# Patient Record
Sex: FEMALE | Race: WHITE | NOT HISPANIC OR LATINO | Employment: FULL TIME | URBAN - METROPOLITAN AREA
[De-identification: names, ages, dates, MRNs, and addresses within clinical notes are randomized per-mention and may not be internally consistent; named-entity substitution may affect disease eponyms.]

---

## 2024-05-10 ENCOUNTER — TELEPHONE (OUTPATIENT)
Dept: TRANSPLANT | Facility: HOSPITAL | Age: 39
End: 2024-05-10
Payer: COMMERCIAL

## 2024-05-10 NOTE — TELEPHONE ENCOUNTER
After confirming identity, intake reviewed with Francesca. Informed her that she is not blood type compatible with the recipient but we can proceed with paired donation. We discussed KPD vs voucher donation. She is willing to proceed with donor evaluation and would like to attend class on 5/20/2024. Informed her that she will receive a Zoom link for the class and not to log in to the [employer] link. Francesca expressed understanding.

## 2024-05-17 ENCOUNTER — TELEPHONE (OUTPATIENT)
Dept: TRANSPLANT | Facility: HOSPITAL | Age: 39
End: 2024-05-17
Payer: COMMERCIAL

## 2024-05-20 ENCOUNTER — EDUCATION (OUTPATIENT)
Dept: TRANSPLANT | Facility: HOSPITAL | Age: 39
End: 2024-05-20
Payer: COMMERCIAL

## 2024-05-20 NOTE — PROGRESS NOTES
Patient received education regarding the following topics as part of their living donor evaluation:  The evaluation process, including:  ·     Transplant team members and roles   ·     Required consultations and testing  ·     Selection criteria and suitability for donation  ·     Psychosocial and financial considerations for a successful transplant  ·     Patient responsibilities, including the necessity of adhering to a strict medical regimen  An overview of the surgical procedure   Potential medical, surgical, and psychosocial risks to transplantation, including:  ·     Wound infection  ·     Pneumonia  ·     Blood clot formation  ·     Complications with remaining kidney including kidney failure and need for dialysis or kidney transplant  ·     Arrhythmias and cardiovascular collapse  ·     Multi-organ system failure  ·     Death  ·     Depression  ·     Post-Traumatic Stress Disorder  ·     Generalized anxiety, issues of dependence, and feelings of guilt  Available alternatives to transplantation  National and Transplant [place] outcomes for one-year patient and graft-survival from the most recent [facility] program-specific report.   Donor risk factors that could affect the success of the transplant and the health of the patient, including:  ·     Donor age  ·     Donor medical and social history  ·     Condition of the organ  ·     Risk of izzy cancer, HIV, Hepatitis B, Hepatitis C, or malaria if the infection is not detectable at the time of donation  Patient's right to withdraw consent for donation at any time during the process. Patient's consent to donate willingly and without pressure (coercion) from anyone, and will not receive payment or financial reward for donating organ.  Transplants not performed in a Medicare-approved transplant center could affect the patient's ability to have immunosuppression medication paid for under Medicare part B.   Multiple listing options.     Patient was given the  opportunity to have questions answered and understands that the living donor team will be available to assist the patient throughout the entire donation process. Patient was provided a copy of the signed informed consent for living donor donation.     Signed evaluation informed consent received? 05/20/2024

## 2024-05-24 DIAGNOSIS — Z52.4 DONOR OF KIDNEY FOR TRANSPLANT: ICD-10-CM

## 2024-05-24 DIAGNOSIS — Z52.4 DONOR OF KIDNEY FOR TRANSPLANT: Primary | ICD-10-CM

## 2024-05-28 ENCOUNTER — TELEPHONE (OUTPATIENT)
Dept: TRANSPLANT | Facility: HOSPITAL | Age: 39
End: 2024-05-28
Payer: COMMERCIAL

## 2024-06-10 DIAGNOSIS — Z52.4 DONOR OF KIDNEY FOR TRANSPLANT: ICD-10-CM

## 2024-06-25 ENCOUNTER — DOCUMENTATION (OUTPATIENT)
Dept: TRANSPLANT | Facility: HOSPITAL | Age: 39
End: 2024-06-25
Payer: SUBSIDIZED

## 2024-06-25 LAB
ALANINE AMINOTRANSFERASE (SGPT) (U/L) IN SER/PLAS EXTERNAL: 31 U/L
ALBUMIN (G/DL) IN SER/PLAS EXTERNAL: 5 G/DL
ALBUMIN (MG/L) IN URINE: 7 MG/L
ALBUMIN/CREATININE (UG/MG) IN URINE: 5 UG/MG CREAT
ALKALINE PHOSPHATASE (U/L) IN SER/PLAS EXTERNAL: 111 U/L
AMPHETAMINES,URINE: NEGATIVE
ANION GAP IN SER/PLAS EXTERNAL: 7 MMOL/L
ASPARTATE AMINOTRANSFERASE (SGOT) (U/L) IN SER/PLAS EXTERNAL: 23 U/L
BILIRUBIN TOTAL (MG/DL) IN SER/PLAS EXTERNAL: 0.7 MG/DL
CALCIUM (MG/DL) IN SER/PLAS EXTERNAL: 9.8 MG/DL
CARBON DIOXIDE, TOTAL (MMOL/L) IN SER/PLAS EXTERNAL: 27 MMOL/L
CHLORIDE (MMOL/L) IN SER/PLAS EXTERNAL: 103 MMOL/L
CHOLESTEROL (MG/DL) IN SER/PLAS EXTERNAL: 158 MG/DL
CHOLESTEROL IN HDL (MG/DL) IN SER/PLAS EXTERNAL: 49 MG/DL
CHOLESTEROL IN LDL (MG/DL) IN SERUM OR PLASMA BY CALCULATION EXTERNAL: 94 MG/DL
CHOLESTEROL/HDL RATIO EXTERNAL: 3.2
COCAINE (PRESENCE) IN URINE BY SCREEN METHOD: NEGATIVE
CREATININE (MG/DL) IN SER/PLAS EXTERNAL: 0.76 MG/DL
CYSTATIN C: 0.88
CYTOMEGALOVIRUS IGG ANTIBODY: NEGATIVE
CYTOMEGALOVIRUS IGM ANTIBODY: NEGATIVE
EBV VCA IGG: POSITIVE
ERYTHROCYTE DISTRIBUTION WIDTH (RATIO) BY AUTOMATED COUNT EXTERNAL: 12.1 %
ERYTHROCYTE MEAN CORPUSCULAR HEMOGLOBIN (PG) BY AUTOMATED COUNT EXTERNAL: 27.8 PG
ERYTHROCYTE MEAN CORPUSCULAR HGB CONCENTRATION (G/DL) BY AUTOMATED EXT: 33.1 G/DL
ERYTHROCYTE MEAN CORPUSCULAR VOLUME (FL) BY AUTOMATED COUNT EXTERNAL: 84 FL
ERYTHROCYTES (10*6/UL) IN BLOOD BY AUTOMATED COUNT EXTERNAL: ABNORMAL
EXTERNAL ABO GROUPING: NORMAL
GLOMERULAR FILTRATION RATE ML/MIN/1.73 SQ M.PREDICTED EXTERNAL: 103 ML/MIN/1.73M*2
GLUCOSE (MG/DL) IN SER/PLAS EXTERNAL: 94 MG/DL
HEMATOCRIT (%) IN BLOOD BY AUTOMATED COUNT EXTERNAL: 44.1 %
HEMOGLOBIN (G/DL) IN BLOOD EXTERNAL: 14.6 G/DL
HEPATITIS C VIRUS AB PRESENCE IN SERUM EXTERNAL: NONREACTIVE
HIV 1/ 2 AG/AB SCREEN: NONREACTIVE
INR IN PPP BY COAGULATION ASSAY: 1 (ref 0.9–1.1)
LEUKOCYTES (10*3/UL) IN BLOOD BY AUTOMATED COUNT EXTERNAL: 4.9 X10*3/UL
Lab: 153 MG/DL
Lab: 29.62
Lab: NEGATIVE
Lab: NONREACTIVE
Lab: NONREACTIVE
Lab: NORMAL
NON HDL CHOLESTEROL EXTERNAL: 109 MG/DL
NRBC (PER 100 WBCS) BY AUTOMATED COUNT EXTERNAL: 5.3 /100 WBCS
OPIATES (PRESENCE) IN URINE BY SCREEN METHOD: NEGATIVE
OXYCODONE: NEGATIVE NG/ML
PARTIAL THROMBOPLASTIN TIME MECHANICAL: 33.2
PHOSPHORUS: 3
PLATELET MEAN VOLUME (FL) IN BLOOD BY AUTOMATED COUNT EXTERNAL: 10.9 FL
PLATELETS (10*3/UL) IN BLOOD AUTOMATED COUNT EXTERNAL: 301 X10*3/UL
POC APPEARANCE, URINE: ABNORMAL
POC BILIRUBIN, URINE: NEGATIVE
POC BLOOD, URINE: ABNORMAL
POC COLOR, URINE: YELLOW
POC GLUCOSE, URINE: NEGATIVE MG/DL
POC KETONES, URINE: NEGATIVE MG/DL
POC LEUKOCYTES, URINE: NEGATIVE
POC NITRITE,URINE: NEGATIVE
POC PH, URINE: 5.5 PH
POC PROTEIN, URINE: NEGATIVE MG/DL
POC SPECIFIC GRAVITY, URINE: 1.02
POC UROBILINOGEN, URINE: 0.2 EU/DL
POTASSIUM (MMOL/L) IN SER/PLAS EXTERMA;: 4.9 MMOL/L
PROTEIN TOTAL EXTERNAL: 7.9 G/DL
PROTEIN URINE EXTERNAL: 6 MG/DL
RPR: NONREACTIVE
SODIUM (MMOL/L) IN SER/PLAS EXTERNAL: 137 MMOL/L
TRIGLYCERIDE (MG/DL) IN SER/PLAS EXTERNAL: 76 MG/DL
UREA NITROGEN (MG/DL) IN SER/PLAS EXTERNAL: 10 MG/DL
URIC ACID: 4.5
VLDL EXTERNAL: NORMAL
WEST NILE IGG ANTIBODY: NEGATIVE
WEST NILE IGM ANTIBODY: NEGATIVE

## 2024-06-26 ENCOUNTER — HOSPITAL ENCOUNTER (OUTPATIENT)
Dept: CARDIOLOGY | Facility: HOSPITAL | Age: 39
Discharge: HOME | End: 2024-06-26
Payer: SUBSIDIZED

## 2024-06-26 ENCOUNTER — LAB (OUTPATIENT)
Dept: LAB | Facility: LAB | Age: 39
End: 2024-06-26
Payer: SUBSIDIZED

## 2024-06-26 ENCOUNTER — DOCUMENTATION (OUTPATIENT)
Dept: TRANSPLANT | Facility: HOSPITAL | Age: 39
End: 2024-06-26

## 2024-06-26 ENCOUNTER — OFFICE VISIT (OUTPATIENT)
Dept: TRANSPLANT | Facility: HOSPITAL | Age: 39
End: 2024-06-26
Payer: SUBSIDIZED

## 2024-06-26 ENCOUNTER — APPOINTMENT (OUTPATIENT)
Dept: TRANSPLANT | Facility: HOSPITAL | Age: 39
End: 2024-06-26
Payer: SUBSIDIZED

## 2024-06-26 ENCOUNTER — HOSPITAL ENCOUNTER (OUTPATIENT)
Dept: RADIOLOGY | Facility: HOSPITAL | Age: 39
Discharge: HOME | End: 2024-06-26
Payer: SUBSIDIZED

## 2024-06-26 ENCOUNTER — SOCIAL WORK (OUTPATIENT)
Dept: TRANSPLANT | Facility: HOSPITAL | Age: 39
End: 2024-06-26
Payer: SUBSIDIZED

## 2024-06-26 VITALS — HEART RATE: 90 BPM | SYSTOLIC BLOOD PRESSURE: 121 MMHG | DIASTOLIC BLOOD PRESSURE: 87 MMHG

## 2024-06-26 VITALS
TEMPERATURE: 98 F | HEIGHT: 68 IN | SYSTOLIC BLOOD PRESSURE: 140 MMHG | HEART RATE: 103 BPM | BODY MASS INDEX: 28.45 KG/M2 | OXYGEN SATURATION: 98 % | WEIGHT: 187.7 LBS | RESPIRATION RATE: 18 BRPM | DIASTOLIC BLOOD PRESSURE: 89 MMHG

## 2024-06-26 DIAGNOSIS — Z52.4 DONOR OF KIDNEY FOR TRANSPLANT: ICD-10-CM

## 2024-06-26 DIAGNOSIS — Z52.4 DONOR OF KIDNEY FOR TRANSPLANT: Primary | ICD-10-CM

## 2024-06-26 DIAGNOSIS — Z00.5 ENCOUNTER FOR EVALUATION TO BE TRANSPLANT DONOR: Primary | ICD-10-CM

## 2024-06-26 DIAGNOSIS — Z00.5 WILLING TO BE KIDNEY DONOR: Primary | ICD-10-CM

## 2024-06-26 DIAGNOSIS — Z00.5 TRANSPLANT DONOR EVALUATION: Primary | ICD-10-CM

## 2024-06-26 LAB
APPEARANCE UR: CLEAR
BILIRUB UR STRIP.AUTO-MCNC: NEGATIVE MG/DL
COLOR UR: ABNORMAL
GLUCOSE UR STRIP.AUTO-MCNC: NORMAL MG/DL
HCG UR QL IA.RAPID: NEGATIVE
HOLD SPECIMEN: NORMAL
KETONES UR STRIP.AUTO-MCNC: NEGATIVE MG/DL
LEUKOCYTE ESTERASE UR QL STRIP.AUTO: NEGATIVE
MUCOUS THREADS #/AREA URNS AUTO: NORMAL /LPF
NITRITE UR QL STRIP.AUTO: NEGATIVE
PH UR STRIP.AUTO: 5.5 [PH]
PROT UR STRIP.AUTO-MCNC: NEGATIVE MG/DL
RBC # UR STRIP.AUTO: ABNORMAL /UL
RBC #/AREA URNS AUTO: NORMAL /HPF
SP GR UR STRIP.AUTO: 1.01
UROBILINOGEN UR STRIP.AUTO-MCNC: NORMAL MG/DL
WBC #/AREA URNS AUTO: NORMAL /HPF

## 2024-06-26 PROCEDURE — 99214 OFFICE O/P EST MOD 30 MIN: CPT | Mod: 25,27 | Performed by: HOSPITALIST

## 2024-06-26 PROCEDURE — 71046 X-RAY EXAM CHEST 2 VIEWS: CPT

## 2024-06-26 PROCEDURE — 99215 OFFICE O/P EST HI 40 MIN: CPT | Performed by: SURGERY

## 2024-06-26 PROCEDURE — 81025 URINE PREGNANCY TEST: CPT

## 2024-06-26 PROCEDURE — 81001 URINALYSIS AUTO W/SCOPE: CPT

## 2024-06-26 PROCEDURE — 74174 CTA ABD&PLVS W/CONTRAST: CPT

## 2024-06-26 PROCEDURE — 93005 ELECTROCARDIOGRAM TRACING: CPT

## 2024-06-26 PROCEDURE — 90791 PSYCH DIAGNOSTIC EVALUATION: CPT | Performed by: PSYCHOLOGIST

## 2024-06-26 PROCEDURE — 2550000001 HC RX 255 CONTRASTS: Performed by: SURGERY

## 2024-06-26 ASSESSMENT — ENCOUNTER SYMPTOMS
CHEST TIGHTNESS: 0
HEMATURIA: 0
NEUROLOGICAL NEGATIVE: 1
FLANK PAIN: 0
RESPIRATORY NEGATIVE: 1
HEMATOLOGIC/LYMPHATIC NEGATIVE: 1
POLYDIPSIA: 0
ABDOMINAL DISTENTION: 0
DYSURIA: 0
BLOOD IN STOOL: 0
CONSTIPATION: 0
PSYCHIATRIC NEGATIVE: 1
POLYPHAGIA: 0
COUGH: 0
SHORTNESS OF BREATH: 0
CARDIOVASCULAR NEGATIVE: 1

## 2024-06-26 ASSESSMENT — PAIN SCALES - GENERAL: PAINLEVEL: 0-NO PAIN

## 2024-06-26 NOTE — PROGRESS NOTES
Francesca was seen by Matt Heredia and Tesfaye for donor evaluation today. Labs reviewed and she still needs to complete her 24h urine collection. Francesca confirmed that she has the needed supplies and will complete this at home in [place]. Based on current lab results she was okayed to proceed with having her CTA done today as scheduled. Pap is up to date and completed in 2021. We will obtain that result from [facility]. Hematuria noted in urinalysis and repeat sample provided today. Will await remainder of results.

## 2024-06-26 NOTE — PROGRESS NOTES
"Encounter    Visit Type Initial Visit  Location: [place] [address]    Barriers to Communication / Understanding:   [] Language [] Vision [] Hearing [] Other     []  Present   Accompanied By: Alone    Organ For Donation: Kidney    Relationship to Recipient: Emotional  's relative, José Miguel Arroyooni    Identification Process    Describe Relationship with Recipient   Pt reported her relationship as \"pretty minimum, I don't know him very well.\"     How was donor identified   Pt reported the recipient handed out cards at a family function.     No Is the recipient aware of your offer    No To your knowledge are there other potential donors    [x] Donor is aware they can change their mind at any time   [x] Donor is aware the reason for the change of mind will be kept confidential   [x] Organ donation is of donor's own free will   [x] Their decision is free of inducement, coercion or other external pressures   [x] Donor is not receiving anything in exchange for their organ     Motivation:    Primary motivation to be a donor   Pt reported she has considered non-directed donation in the past. Pt shared she has read/heard about how great the need is for living donors.     Has patient been persuaded or dissuaded in any way   No    How will donation impact your relationship   Pt reported she does not think donation will impact their relationship.     Prior altruistic behaviors  [] None [] Other     [x] Registered Organ Donor on License [x] Blood Donor [] Bone Marrow Donor     Decision Making:    Donors process for making important decisions   Pt reported she talks about the decision a lot with people in her life who she is close with.      Patient's thought process seems to be    [x] Adequate [] Idealistic [] Grandiose [] Other     Patient's insight presents as    [x] Adequate [] Denial [] Absent [] Other   Other Comments:    Health Status of Donor    PCP Dr. Contreras Date of Last Physical April  How Often " "Yearly  Current Health   Denied  Current Meds / Supplement Use   Vitamin D, iron supplement during period, Ibuprofen   Past Surgical Experience   Denied    ** Assessment of increased risk for CDC high disease transmission  [x] Completed/  Reviewed   Knowledge of Recipients Health Fair    Cause of recipients end stage organ disease   Diabetes    Knowledge of alternative treatments for recipient    Are they on dialysis   \"I believe so\"    Patient believes the recipient is compliant with their health and will be able to care for the donated organ   \"I don't know him well.\"    Knowledge of Transplant / Donation:  [x] Patient is able to make an informed decision     [x] Patient has received education regarding medical, psychosocial and financial risks related to living donation        Patient Understand Risks of Donation  Yes Adverse findings Yes infection Yes complications   Yes death   Yes pain, discomfort and bloating   Yes potential scar Yes nerve injury  Yes alteration in kidney functioning   Yes preeclampsia  Yes fatigue    Yes Potential recipient complications were discussed with patient including:  Possibility of delayed kidney function  Dialysis after transplant (< 4%)  Failure of kidney after transplant in first year (5%)  Infection  Return of original disease  Death    Patient has an awareness of the two possible procedures laparoscopic or open nephrectomy Yes      Patient Understands Recovery and Follow Up From Donation  Yes length of stay Yes appointments    Education:   [employer] education: Other PhD    Yes Literate   ESL  IEP   Learning Disability Yes Computer literate   Yes Internet access   Developmental Disability    Sources of Income   Business operations, part-time     Will patient be in a paid status during recovery   No    Does patient have financial concerns   No    Does the patient have health insurance   Yes - [facility]    Patient Understands Financial Risks of Donation    Yes Personal " "expenses  Yes Expense of travel / lodging  Yes Expense of childcare  Yes Lost wages may not be reimbursed  Yes Need for lifelong follow-up at donor's expense  Yes Future health problems experienced by the donor following donation may not be covered by the recipient's health insurance  Yes Negative impact on ability to obtain or maintain affordable life, health and disability insurance    Siblings:  3 # Biological (1 brother, 2 sisters)  # Half Siblings  # Step Siblings      Sibling #1  Sibling #2  Sibling #3   Sibling #4   Sibling #5   Sibling #6   Sibling #7     Relationship Status / Family Dynamic:    Single    Yes How long 8 years  Describe Relationship \"Healthy, stable\"    How long    Describe Relationship    When      When  In a Relationship   How Long  Describe Relationship    Spouse / SO Name Mode  Age 40  Health  \"Good\"  Spouses Education Level SW LD education : Masters Degree    Other Caregiver Responsibilities    Children:  Yes # Biological (1 son, 1 daughter)   # Adopted    # Step Children         Child #1 Name Herbie  Age  7  Health \"Good\"    Lives With patient  How Much Contact Daily  Comment     Child #2 Name Tamara  Age  5  Health \"Good\"    Lives With patient  How Much Contact Daily    Support & Recovery Plan:   Yes Adequate    Primary Support:  Jaison Coello Phone [phone]  Age 40  Relationship to Patient   If employed, can they take time off work Yes   If so, is it paid time off    If not, will this impact your finances    Did they attend education classes No   Do they have other caregiver responsibilities (child or eldercare) Yes   Pt reported her in-laws can care for the children.   Do they have their own conditions which may prevent them from providing care for you No  (Medical, psychological, physical limitations)    Are they available on short notice Yes   Are they reliable Yes   Are they responsible Yes   Are they able to understand and process " "new information Yes   Do they have reliable transportation or will you allow them to use your vehicle Yes   Are they currently involved in your care Yes   Comments    Secondary Support:  Name Glenys Phone [phone]  Age 30  Relationship to Patient Sister  If employed, can they take time off work Yes   If so, is it paid time off    If not, will this impact your finances    Did they attend education classes No   Do they have other caregiver responsibilities (child or eldercare) No   Do they have their own conditions which may prevent them from providing care for you No  (Medical, psychological, physical limitations)    Are they available on short notice Yes   Are they reliable Yes   Are they responsible Yes   Are they able to understand and process new information Yes   Do they have reliable transportation or will you allow them to use your vehicle Yes   Are they currently involved in your care Yes   Comments    Alternate Support   Alternate Support     Housing:  Yes Adequate Rents home  Type of Home House  Distance to [facility] 3 hours  Pets 0  Does Patient Feel Safe in Home Yes    Transportation:  Yes Adequate  # Licensed Drivers in the Home 2  Does Patient Drive Yes If not, why   # Reliable Vehicles 2  Does Patient use Public Transportation Yes  Does Patient use Medical Transportation No  Comments       Mental Health    The patient reports their mood as \"good, some low mood at the start of summer.\"     Reported Mental Health Diagnosis   Depression and anxiety  Family History of Mental Health Concerns   Depression and anxiety  What are patient psychosocial stressors   Pt reported her children and her/her 's families as current stressors.     Cognition:  No impairment observed / reported       Current Medications:  No  Mental Health Meds  Denied  Rx'd by   Sleep Meds  Denied  Rx'd by   Pain Meds  Denied  Rx'd by     OTC Meds   Vitamins/supplements, Ibuprofen  Past Medications   Denied    Counseling Previously  Pt " reported the most recent time she engaged in counseling was in 2019. Pt shared she was in group and individual counseling due to post-partum depression and anxiety. Pt reported she went for 2-3 years and found it helpful. Pt shared she engaged in counseling 2 other times in the past. Pt declined MH resources at this time.     IOP  Has patient ever been hospitalized for mental health reasons No   Was the hospitalization voluntary  Duration   Where    When  Describe situation    Discharge Plan for Follow Up  Was Discharge Plan Completed     Referral to Transplant Psych Yes  No Is patient under 25 years old  No Is patient a non-directed donor  No Nutcracker syndrome        Mental Health Follow Up Required      Suicide Assessment:  History of Suicide Ideation No   Timeframe  Frequency  Frequency   Plan Created  Intent to Follow Through  Outcome      History of Suicide Attempt No     History of Suicidal Ideation in the past 3 months No   Intensity   Duration     Description of Plan      Plans thought of  Intent to Follow Through  Highest Level of Intent to Follow Through    Current Plan for Safety    Plan for Follow-Up    Patient's Reported Trauma History    What are patient's coping behaviors   Pt reported writing, listening to music, walking, and swimming as coping behaviors.     Anabaptist / Spirituality   Mu-ism    Attitude toward interviewer Cooperative and Appropriate    Eye Contact Patient maintained good eye contact throughout appointment    Appearance The patient was neatly groomed, appropriately dressed and adequately nourished    Affect Appropriate    Thought Process Appropriate    Is the patient's mental health stable enough to proceed with living donation process and donation Yes    Does the patient understand the psychological risks of living donation?  Yes Feelings of regret, resentment or anger   Yes Donated organ may not function in recipient  Yes Changes in body image   Yes Potential for depression,  anxiety, emotional distress or grief  Yes You and / or the recipient may have complications from the surgery                Yes Does the patient understand the psychological risks to the recipient?  Potential psychological risks for the recipients include possible changes in body image, depression, or anxiety after the surgery or a change in lifestyle.   Emotional distress or grief may occur and this could be related to any of the following possible situations: delayed kidney function, rejection, or failure of the transplanted kidney in the recipient.   In some cases (2-4%), the recipient needs some dialysis after the transplant until the kidney works well.   The chance of the donated kidney failing in the first year after the transplant is about 5%. These situations may or may not require another transplant for the recipient.   There is a possibility of the recipient having their original disease return in the new kidney.   There is also a possibility of recipient death during or after the surgery.      Substance Use /Abuse History:  Current Tobacco User No  Patient uses   Tobacco Frequency   For How Long  Is Patient Required to Quit     Former Tobacco User No  Describe past tobacco use and date quit    Current Alcohol User Yes  Type of Alcohol Used   Amount  Frequency   Pattern of Alcohol Use  Pt reported she will have 1-2 drinks a month. Pt shared her last alcohol use was 1-2 weeks ago.     Is Patient Required to Quit   Continued to use the substance despite being told the substance is affecting their health    History of problems at work, school or home due to substance use      Former Alcohol User Yes  Describe past alcohol use and date quit  Pt reported heavier alcohol use in her 20s/college. Pt shared she drank alcohol daily during the pandemic. Pt reported she would have 1-3 drinks a day during this time. Pt shared she was concerned about her use and began counseling to find other coping mechanisms.     Has  patient ever gone to CD treatment   If yes, When, Where and What type of Program  Attends AA meetings    Sponsor  Do support people drink alcohol   If yes, describe support people's use  Is alcohol kept in the home   Does Patient need to sign a CD contract     Current Illegal / Unprescribed Drug User No  Type of Illegal Drug Used   Frequency  Pattern of Drug Use    Is Patient Required to Quit     Illegal / Unprescribed Drug #2  Type of Illegal Drug Used   Frequency  Pattern of Drug Use      Continue to use the substance despite being told the substance is affecting their health    History of problems at work, school or home due to substance use      Former Illegal / Unprescribed Drug User Yes  Describe past illegal drug use and date quit  Pt reported she last used marijuana 3 years ago. Pt shared she would use marijuana randomly, less than monthly.     Has patient ever gone to CD treatment   If yes, When, Where and What type of Program   Attends AA/NA  meetings    Is patient on a Methadone / Suboxone regiment   Do support people use illegal drugs   If yes, describe support people's use  Are illegal drugs kept in the home   Does Patient need to sign a CD contract     Illegal / Unprescribed Drug #2  Type of Illegal Drug Used   Frequency  Pattern of Drug Use    Prescription Drug Abuse:  No Has patient experienced feelings of addiction  No Has patient experienced symptoms of withdrawal  No Has patient experienced any side effects? e.g.  hallucinations or delusions    Does Patient Meet the Criteria for Alcohol Use Disorder Yes Diagnosis Alcohol use disorder, mild, no remission  Does Patient Meet OSOTC guidelines No  Does Patient Meet the Criteria for Illegal Drug Use Disorder No Diagnosis  Does Patient Meet OSOTC guidelines Yes    [facility]OTC Substance Relapse Risk Factors Low Risk (1-3)  Yes Has not been abstinent for more than 3 months (outside a controlled environment).    Yes Has not participated in at least 1 month (3  meetings a week for 4 weeks=12) of an active recovery program (structured treatment program and/or documented 12 step meeting attendance with sponsor selection contact).    Yes Has not participated in at least 3 month (3 meetings a week for 12 weeks=36) of an active recovery program (structured treatment program and/or documented 12 step meeting attendance with sponsor selection contact).    No Does not have an adequate sober, stable social network to support the patient's commitment to abstinence both pre and  post- transplant.    No Does not have insight into his/her past misuse/abuse.    No Persistent desire or unsuccessful efforts to cut down or control use.    No Continued to use substance despite being told that the use is affecting his/her health.    No Has a psychiatric disorder and does not have adequate coping skills for dealing with stressors.    No History of problems at work, school or home due to substance use.    No Has had two or more failures with a structured rehabilitation program.    No Has failed random toxicology screens during medical evaluation for transplant.    No Meets criteria for multiple substance use disorders.    No History of driving under the influence or other legal consequences of substance use.      DSM-5 Severity Factors: Mild (2-3)    Yes The substance is often taken in larger amounts or over a longer period than was intended.    Yes There is a persistent desire or unsuccessful effort to cut down or control use of the substance.    No A great deal of time is spent in activities necessary to obtain the substance, use the substance, or recover from its effects.    Yes Craving, or strong desire or urge to use the substance.    No Recurrent use of the substance resulting in a failure to fulfill major role obligations at work, school, or home.    No Continued use of the substance despite having persistent or recurrent social or interpersonal problems caused or exacerbated by the  effects of its use.    No Important social, occupational, or recreational activities are given up or reduced because of use of the substance.    No Recurrent use of the substance in situations in which it is physically hazardous.    No Use of the substance is continued despite knowledge of having a persistent or recurrent physical or psychological problem that is likely to have been caused or exacerbated by the substance.    No Tolerance, as defined by either of the following:  A need for markedly increased amounts of the substance to achieve intoxication or desired effect.  A markedly diminished effect with continued use of the same amount of the substance.    No Withdrawal, as manifested by either of the following:  The characteristic withdrawal symptom for that substance (as specified in the DSM-V for each substance).  The substance (or closely related substance) taken to relieve or avoid withdrawal symptoms.    Plan     Legal Issues:  No Arrests  No Currently probation or parole No   shelter No  When   How long   Where       Citizenship:  Yes [place] Citizen   Green Card  Visa    Advance Directives: No  HCPOA   SW provided documents.     Guardian:    Impression:  SW met with Pt alone for initial psychosocial assessment. Pt was pleasant and engaged. Pt reported she is hoping to donate to a relative of her . Pt shared her primary motivation to donate is she has read/heard about how great the need is for living donors. Pt reported she is donating of her own free will and understands her information will be kept confidential from the recipient. Pt reported she is not being pressured to donate and is not receiving anything in exchange for donation. Pt expressed understanding that she can change her mind at any time. SW reviewed the CDC increased risk behaviors with Pt, and Pt denied engaging in any of these behaviors in the past 3 months. 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 Pt shared  "she will not have access to paid time off. Pt denied any financial concerns related to taking time off for donation. Pt reported she has [facility] for insurance. Pt reported her mood as \"good, some low mood at the start of summer.\" Pt shared she has been diagnosed with depression and anxiety and has engaged in counseling in the past. Pt scored a 3 on the PHQ-9 and ROLF-7, indicating minimal clinical depression and anxiety. SW referral to transplant psych. Pt denied any current or past tobacco use. Pt reported she will have 1-2 drinks a month. Pt shared her last alcohol use was 1-2 weeks ago. Pt reported heavier alcohol use in her 20s/college. Pt shared she drank alcohol daily during the pandemic. Pt reported she would have 1-3 drinks a day during this time. Pt meets criteria for Alcohol use disorder, mild, no remission. Pt denied any current illicit drug use. Pt reported she last used marijuana 3 years ago. Pt shared she would use marijuana randomly. Pt is moderate psychosocial risk due to Pt's MH and Pt's current alcohol use with Alcohol use disorder, mild. SW would recommend Pt for donation while monitoring Pt's risk factors.     Plan:  Pt to meet with transplant psych. SW to await recommendations from transplant psych. SW to meet with Pt annually, or post-donation, whichever comes first.  "

## 2024-06-26 NOTE — PROGRESS NOTES
"BEHAVIORAL HEALTH: PSYCHOLOGICAL ASSESSMENT FOR LIVING DONATION    Patient's Name: Francesca Ureña  :  1985  MRN:  [id number]    Diagnosis: transplant donor evaluation  Intended recipient: family member (husbands mom's first cousin's )    Date of Service: 2024    Start Time: 10:15 am   End Time: 11:00 am  Location: Transplant [place], [facility]     Presenting information: Francesca Ureña was seen in the transplant clinic for this psychological assessment as one part of a comprehensive evaluation for living kidney donation. She engaged easily in the conversation and asked pertinent questions about the living donor process and recovery from the surgery. She lives in [place]. She thinks the identified recipient is on dialysis but she is not sure about his situation--she is not close to him. She said she would not be donating to him directly, so it would either be on a voucher or as part of a swap. She would like to keep this anonymous, if possible, indicating that he is far enough removed from her part of the family that she thinks it feasible he would not need to learn that she donated on his behalf. When she was younger she considered NDD but she was not in the right place in her life before, either because of lack of finances or other obligations.    MEDICAL HISTORY  Prior surgeries: none    Diet/exercise: cardio at the gym a few times a week, walks, basketball, water aerobics; observes a gluten-free diet for Celiac     Attendance at Appointments:   -PCP: yes   -Mental health: not currently, has in the past   -PT/Rehabilitation: N/A    Use of NSAIDS: not regularly; also uses acetominophen    SUBSTANCE USE  Tobacco: never     Alcohol: 1-2 drinks a month    Illicit Substance Use: none    Substance Disorder Treatment: none    PSYCHOLOGICAL HISTORY  Current mood  She acknowledged today veering into \"a little bit of a low mood\" but said she knows how to steer " herself back out of funks.     Previously Diagnosed Psychological Disorders:   Mood Disorders:    -Depression/anxiety: She recalled having developed symptoms of depression and anxiety in high school after her father left the family when she was 15 years old. She said she didn't develop any mood disorders until later and believes this happened as she tried to come to terms with the family's losses.  Developmental/Behavioral Disorders: none  Cognitive Disorders: none  Personality Disorders: none  Psychotic Disorders: none    Treatment for Psychological Disorders:  Outpatient counseling:   -number of treatment episodes: three episodes over time/individual and group, was helpful  Pharmacological management: none  Inpatient treatment: none    Suicidal/Homicidal Tendencies: denied    Coping Strategies: She will retreat to re-center herself, finds quiet, goes for walks, writes.    Coping with surgery/recovery: She would sonal, read, watch [employer] games, do some work.    Suicide Risk Assessment:  Risk factors: none  Protective factors: marriage/partnership, children, good social support, expressed desire to live    Mental Status Exam:  General Appearance: well groomed, appropriate eye contact  Attitude/Behavior: cooperative  Motor: no psychomotor agitation or retardation, no tremor or other abnormal movements  Speech: normal rate, volume, prosody  Gait/Station: not tested  Mood: euthymic   Affect: euthymic, full-range  Thought Process: linear, goal directed  Thought Associations: no loosening of associations  Thought Content: normal  Perception: no perceptual abnormalities noted  Sensorium: alert and oriented to person, place, time and situation  Insight: intact  Judgment: intact  Cognition: cognitively intact to conversational testing with respect to attention, orientation, fund of knowledge, recent and remote memory, and language    SOCIAL HISTORY  Siblings: younger brother (37), sister (35), sister (30)  Parents: both  living (estranged from father)  Relationship Status:  8 years  Children: 7 and 5  Education: high school  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    DONOR-SPECIFIC ISSUES  -Advised supervisors of potential absence from work? Not yet  -Type of leave available: unpaid leave  -Financial backups in place in the event of loss of income: Should not be a problem to go without pay for a while; her  would be working and she can do a lot remotely.    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 She is a first-hand witness to what kidney failure looks like. She wasn't able to donate before because of lack of finances or not being in the right stage of life. This seems like it could be the right time.  -Other types of charitable giving: donates blood, registered as an organ donor    -On a scale from 0 (no desire to donate) to 10 (absolute desire to donate), rate yourself regarding your readiness and willingness to donate: 8  -How would you emotionally react to learning that the kidney you donated did not help the recipient: She does not want to know anything about the actual recipient.  -What regrets do you anticipate having after donation? What if there were a family member she was closer to who needed a kidney down the road.    FOR NON-DIRECTED DONORS AND DONORS INVOLVED IN PAIRED-EXCHANGE/NKR  -Do you want to know anything about the recipient, or have an opportunity for communication with them? She would prefer to keep this anonymous both with the actual recipient and the intended one.  -Do you have any concerns about the recipient? no  -Do you have any concerns about the matching process in  this type of donation? no    IMPRESSIONS  Overall health and commitment to aftercare: Ms. Ureña is a health-conscious person who appears committed to maintaining her activity level and overall health in the future.    Relapse risk issues: She has no significant history of use; this is not a concern.    Psychological issues: Ms. Ureña has a history of depression/anxiety that she believes developed in the aftermath of her father deserting the family when she was a teenager. She has been through counseling in the past on different occasions to process her feelings. She has developed a solid repertoire of coping strategies and is intuitive to her own internal states. She has been able to effectively course correct when she perceives her mood is shifting in a negative direction. I do not have any concerns about her ability to successfully navigate a donor process from a psychological standpoint.    Social support:  appropriate    Coercion: I detected no evidence of overt coercion in the decision to come forward as a living donor. Further, this potential donor did confirm understanding of the ability to end this evaluation and/or stop the process at any point leading up to the actual surgery for any reason.    Informed consent: This potential donor expressed a reasonable understanding of risks of the surgery and a reasonable understanding of the postoperative course and long-term expectations for self-care.    RECOMMENDATIONS  APPROVE FOR DONATION: Ms. Ureña explored the possibility of non-directed donation in the past, and thus has had a long time to consider this decision. I think she has reasonable expectations for donation through a swap or on a voucher and has structures around her that would help her successfully maneuver this process. She is capable of making an informed decision.    Krista Arreola, PhD  Licensed Psychologist, Transplant [place]  Clinical Professor, Dept of Psychiatry  [facility]  [facility]

## 2024-06-26 NOTE — PROGRESS NOTES
"Transplant Nephrology Evaluation of Donor :    Ms Francesca Ureña was here to get evaluated for being a potential Donor .    History of Present illness: Ms. Ma  is a 38-year-old female with a no significant past medical history except celiac disease was here to get evaluated to be a potential donor to her 's uncle.  -She denied any history of kidney disease.  No history of kidney stones or pain medication use.  -She does have celiac disease prior but well-controlled now.  -She denied any history of coronary artery disease or stroke.  -She does have 2 healthy pregnancies with 2 kids but never had any miscarriages or abortions.  -No history of blood clots.  -No psychiatric history      Past Medical HX: Celiac disease    Family Hx : Unable to know about  her father's history, siblings and mother are healthy    Social Hx : Lives with her  and 2 kids.  Drinks occasionally but denied any drug use or smoking history    Review of Systems   HENT: Negative.     Respiratory: Negative.  Negative for cough, chest tightness and shortness of breath.    Cardiovascular: Negative.  Negative for leg swelling.   Gastrointestinal:  Negative for abdominal distention, blood in stool and constipation.   Endocrine: Negative for polydipsia, polyphagia and polyuria.   Genitourinary:  Negative for decreased urine volume, dysuria, flank pain, hematuria and urgency.   Neurological: Negative.    Hematological: Negative.    Psychiatric/Behavioral: Negative.          Objective:  Visit Vitals  /89 (BP Location: Right arm, Patient Position: Sitting)   Pulse 103   Temp 36.7 °C (98 °F) (Temporal)   Resp 18   Ht 1.727 m (5' 8\")   Wt 85.1 kg (187 lb 11.2 oz)   SpO2 98%   BMI 28.54 kg/m²   BSA 2.02 m²      Physical Exam  HENT:      Head: Normocephalic.   Eyes:      Pupils: Pupils are equal, round, and reactive to light.   Cardiovascular:      Rate and Rhythm: Normal rate and regular rhythm.   Pulmonary:      Effort: Pulmonary effort " is normal. No respiratory distress.      Breath sounds: No wheezing or rales.   Abdominal:      General: There is no distension.      Tenderness: There is no abdominal tenderness. There is no rebound.   Musculoskeletal:         General: Normal range of motion.   Skin:     General: Skin is warm.      Findings: No bruising, lesion or rash.   Neurological:      General: No focal deficit present.   Psychiatric:         Mood and Affect: Mood normal.          No current outpatient medications on file.     [unfilled]     No images are attached to the encounter.     Assessment and Plan :Francesca Ureña 37 y/o female with a no significant past medical history except celiac disease was here to get evaluated to be a potential donor to her 's uncle.    -Seems to be a good candidate for kidney donation.  -So far testing showing good kidney function but urine analysis showing blood she mentioned that she is at the end of her period .  Recommended to repeat urine analysis.  -Cystatin C-calculated is 0.88.-Albumin urine is a around 7 waiting for 24-hour urine collection reports  -So far urine toxicology was negative.  -Serology is positive for EBV, negative for TB testing, CMV negative, West Nile negative, HIV nonreactive, hepatitis B surface antigen as well as core antibody negative.  -Awaiting CAT scan for surgery to review.  -Once we get all the blood testing will discuss in the committee for considering to be a potential donor    I had a long discussion with the patient concerning the risks of kidney donation. I specifically pointed out that the donor gets no benefit from this medical intervention and only incurs risk. We discussed the fact that they should feel comfortable removing themselves from the donation process at any time should they feel uncomfortable with donating.     The confidentiality of the donor will be maintained at all times. I reiterated that I cannot share medical information with the  donor about the recipient or vice versa. I reiterated the fact that the recipient may have risk factors for a bad outcome that I cannot necessarily share with the donor. It is a federal crime to receive any compensation monetary or otherwise for donating a kidney. If we find out this is occurring, we will terminate the process.     We discussed the importance of not smoking. We discussed the risks of the surgery which include but are not limited to bleeding, infection, scarring, pain, DVT, PE, kidney failure, organ failure, heart attack, stroke, damage to surrounding structures, obesity, fatigue, nausea/bloating, risk for small bowel obstruction, risk for development of hernias, and death.     We discussed the post-operative course both in the hospital and once they are discharged. We discussed the follow-up schedule which will be 2 weeks post-surgery with me and at 6 months 1 year and 2 years post-surgery with nephrology. We discussed the financial implications of donation including job loss and difficulties obtaining insurance. We discussed the possibility of trauma to the one remaining kidney and should this happen they may go to kidney failure. We discussed the fact that NSAID use should be avoided for the rest of their life. If they do in fact develop kidney failure, they will require dialysis. They do receive extra points to move up the  donor list should they require a kidney transplant themselves.    Finally, we discussed the implications of pregnancy after donation. We discussed the difficulties with possibly getting pregnant as well as the increased risk of preeclampsia and miscarriage. I encouraged her to seek  from her OB/GYN regarding this matter.    Time was given to provide answers to all questions.  The donor expressed understanding and wished to proceed with donation.   The donor will be taken to committee for final decision on appropriateness once the entire evaluation is  completed.      Yan Bañuelos MD    Notes created by Noam -Please excuse the Typos .

## 2024-06-26 NOTE — PROGRESS NOTES
Spoke to pt on phone for virtual donor apt Pt understands she is not to be billed for txp related services. Discussed Preex & LI access. Pt understands if treatment would be needed that would not be txp related he would be billed.

## 2024-06-27 ENCOUNTER — CONSULT (OUTPATIENT)
Dept: TRANSPLANT | Facility: HOSPITAL | Age: 39
End: 2024-06-27
Payer: SUBSIDIZED

## 2024-06-27 NOTE — PROGRESS NOTES
Living donor advocate report:   I spoke with Ms. Ureña this afternoon regarding her decision to donate a kidney to her 's relative. We discussed the following topics:     a) evaluation and informed consent process  b) knowledge of the surgical procedure, major risks and benefits (immediate and long-term; medical and psycho-social)  c) evidence of ability to evaluate risks and benefits to both the recipient and herself  d) reasons for choosing to donate  e) voluntary nature of the donation and absence of coercion   f) follow up requirements, including benefit and need     She was also given the following information:   a) the confidential nature of the interview   b) the commitment of the donor advocate to the rights, interests, and well being of the potential donor  c) opportunities for deciding not to donate and assurance of confidentiality if she wishes to withdraw consent  d) access to the advocate team following the interview.    It is my impression that Ms. Ureña is adequately informed; her decision is voluntary.

## 2024-06-28 ENCOUNTER — DOCUMENTATION (OUTPATIENT)
Dept: TRANSPLANT | Facility: HOSPITAL | Age: 39
End: 2024-06-28
Payer: SUBSIDIZED

## 2024-06-28 LAB
ATRIAL RATE: 77 BPM
EGFR BY CYSTATIN C: 103
ESTIMATED AVERAGE GLUCOSE FOR HBA1C EXTERNAL: NORMAL
HEMOGLOBIN A1C/HEMOGLOBIN TOTAL IN BLOOD EXTERNAL: 5.6 %
P AXIS: 62 DEGREES
P OFFSET: 204 MS
P ONSET: 148 MS
PR INTERVAL: 146 MS
Q ONSET: 221 MS
QRS COUNT: 13 BEATS
QRS DURATION: 90 MS
QT INTERVAL: 376 MS
QTC CALCULATION(BAZETT): 425 MS
QTC FREDERICIA: 408 MS
R AXIS: 63 DEGREES
T AXIS: 70 DEGREES
T OFFSET: 409 MS
VENTRICULAR RATE: 77 BPM

## 2024-06-28 ASSESSMENT — ANXIETY QUESTIONNAIRES
GAD7 TOTAL SCORE: 3
4. TROUBLE RELAXING: NOT AT ALL
IF YOU CHECKED OFF ANY PROBLEMS ON THIS QUESTIONNAIRE, HOW DIFFICULT HAVE THESE PROBLEMS MADE IT FOR YOU TO DO YOUR WORK, TAKE CARE OF THINGS AT HOME, OR GET ALONG WITH OTHER PEOPLE: NOT DIFFICULT AT ALL
2. NOT BEING ABLE TO STOP OR CONTROL WORRYING: NOT AT ALL
6. BECOMING EASILY ANNOYED OR IRRITABLE: SEVERAL DAYS
5. BEING SO RESTLESS THAT IT IS HARD TO SIT STILL: NOT AT ALL
7. FEELING AFRAID AS IF SOMETHING AWFUL MIGHT HAPPEN: SEVERAL DAYS
3. WORRYING TOO MUCH ABOUT DIFFERENT THINGS: NOT AT ALL
1. FEELING NERVOUS, ANXIOUS, OR ON EDGE: SEVERAL DAYS

## 2024-06-28 ASSESSMENT — PATIENT HEALTH QUESTIONNAIRE - PHQ9
4. FEELING TIRED OR HAVING LITTLE ENERGY: SEVERAL DAYS
6. FEELING BAD ABOUT YOURSELF - OR THAT YOU ARE A FAILURE OR HAVE LET YOURSELF OR YOUR FAMILY DOWN: SEVERAL DAYS
SUM OF ALL RESPONSES TO PHQ9 QUESTIONS 1 & 2: 1
8. MOVING OR SPEAKING SO SLOWLY THAT OTHER PEOPLE COULD HAVE NOTICED. OR THE OPPOSITE, BEING SO FIGETY OR RESTLESS THAT YOU HAVE BEEN MOVING AROUND A LOT MORE THAN USUAL: NOT AT ALL
SUM OF ALL RESPONSES TO PHQ QUESTIONS 1-9: 3
10. IF YOU CHECKED OFF ANY PROBLEMS, HOW DIFFICULT HAVE THESE PROBLEMS MADE IT FOR YOU TO DO YOUR WORK, TAKE CARE OF THINGS AT HOME, OR GET ALONG WITH OTHER PEOPLE: NOT DIFFICULT AT ALL
2. FEELING DOWN, DEPRESSED OR HOPELESS: SEVERAL DAYS
7. TROUBLE CONCENTRATING ON THINGS, SUCH AS READING THE NEWSPAPER OR WATCHING TELEVISION: NOT AT ALL
5. POOR APPETITE OR OVEREATING: NOT AT ALL
1. LITTLE INTEREST OR PLEASURE IN DOING THINGS: NOT AT ALL
3. TROUBLE FALLING OR STAYING ASLEEP OR SLEEPING TOO MUCH: NOT AT ALL
9. THOUGHTS THAT YOU WOULD BE BETTER OFF DEAD, OR OF HURTING YOURSELF: NOT AT ALL

## 2024-06-28 NOTE — PROGRESS NOTES
Chief Complaint: Patient presents for kidney donor evaluation    History of Present Illness:  Francesca Ureña  is a 38 y.o. female presents to be considered as kidney donor for distant relative of her . She and the recipient are not blood-type compatible. She wishes to donate into [facility] to anonymous recipient.    Past Medical History:  Celiac disease    Past Surgical History:  [place] teeth extraction    Review of Systems:  Cardiac: Denies chest pain, palpitations  : Normal urine output. Denies history of gross hematuria, nephrolithiasis, urinary retention, or recurrent UTIs.  Vascular: Denies personal or familial history of DVT/PE.   Functional Status: Can walk up 2 flights of stairs    Transfusions:  None  Pregnancy: 2 full-term, vaginal deliveries. No PIH, pre-eclampsia, gestational DM.    Family History:  Mother: healthy  Father: health history unknown  Sibling: 3 healthy siblings  Children: ages 5 and 7; healthy    Social History:  Marital status:   Work:  for [employer]  Tobacco: never  Alcohol: 1-2 drinks/month  Other illicit: none    Physical Exam:  Gen: A+OX3; NAD  HEENT: PERRL, sclera anicteric, MMM  Cardiac: RRR  Chest: Normal inspiratory effort  Abdomen: S/NT/ND.  Ext: No LE edema  Vascular: 2+ palpable femoral pulses  Psychiatric: Normal mood, affect    Assessment/Plan:  - The patient is an excellent candidate for kidney transplantation.  - Routine age/gender based screening  - Cardiac testing per protocol  - CTA scan abdomen/pelvis    Kidney Donor Education:     I had a discussion with this patient regarding 1 year graft and patient survival statistics following renal transplantation for both living and  donor allograft recipients. This data included [facility] data compared to National data readily available for review on https://www.SRTR.org. The patient also had attended the kidney donor education class provided by the transplant  [facility].     Further discussion included:  -The kidney donor selection committee process.  -The option for  donor wait list for recipient  -Medical data on long-term follow-up of living kidney donors  -Pregnancy after donation: patient expressed not planning future pregnancies     Surgical complications including need for reoperation(s) including but not limited to:  -Bleeding.  -Control of infection.  -Lymphatic leak     The medical complications including but not limited to:  -Death.  -Cardiac.  -Pulmonary.  -Infectious.  -Neurologic.  -Other Complications.     Time Attestation:  I spent 60 minutes with the patient, over 50 minutes in counseling and education as outlined above.     Sola Ramirez MD, PHD, MPH, FACS  Chief of Transplant and Hepatobiliary Surgery    Sola Ramirez MD, PHD, MPH, FACS  Chief of Transplant and Hepatobiliary Surgery

## 2024-07-05 LAB
ATRIAL RATE: 77 BPM
P AXIS: 62 DEGREES
P OFFSET: 204 MS
P ONSET: 148 MS
PR INTERVAL: 146 MS
Q ONSET: 221 MS
QRS COUNT: 13 BEATS
QRS DURATION: 90 MS
QT INTERVAL: 376 MS
QTC CALCULATION(BAZETT): 425 MS
QTC FREDERICIA: 408 MS
R AXIS: 63 DEGREES
T AXIS: 70 DEGREES
T OFFSET: 409 MS
VENTRICULAR RATE: 77 BPM

## 2024-07-08 LAB — EXTERNAL ABO GROUPING: NORMAL

## 2024-07-16 ENCOUNTER — DOCUMENTATION (OUTPATIENT)
Dept: TRANSPLANT | Facility: HOSPITAL | Age: 39
End: 2024-07-16
Payer: SUBSIDIZED

## 2024-07-16 ENCOUNTER — COMMITTEE REVIEW (OUTPATIENT)
Dept: TRANSPLANT | Facility: HOSPITAL | Age: 39
End: 2024-07-16
Payer: SUBSIDIZED

## 2024-07-16 NOTE — LETTER
July 16, 2024    Francesca Ureña  [address]  [facility] [address]      Dear Ms. Ureña:    Our multi-disciplinary transplant team completed a review of your medical records on 7/16/2024.  I am pleased to inform you that you will be placed on the [facility] (UNOS) waiting list for a Kidney transplant.    Our transplant program consists of surgeons and medical doctors who provide coverage 365 days a year, 24 hours a day.     If you have any questions or concerns regarding your insurance coverage or billing issues, a  is available to speak with you.     It is important to keep us updated of any major changes in your medical condition, contact information and health insurance coverage.     Please don't hesitate to contact us at Dept: [phone] with any questions or concerns. We look forward to working with you through this process.      Sincerely,      Rachel Roberts RN          The UNOS Toll-free Patient Services Line:  Your Resource for Organ Transplant Information    If you have a question regarding your own medical care, you always should call your transplant hospital first. However, for general organ transplant-related information, you should call the [facility] (UNOS) toll-free patient services line at 1-999.137.2183.  Anyone, including potential transplant candidates, candidates, recipients, family members, friends, living donors, and donor family members, can call this number to:    Talk about organ donation, living donation, the transplant process, the donation process, and transplant policies.  Get a free patient information kit with helpful booklets, waiting list and transplant information, and a list of all transplant hospitals.  Ask questions about the Organ Procurement and Transplantation Network (OPTN) web site (http://optn.transplant.hrsa.gov/), the UNOS Web site (http://unos.org/), or the UNOS web site for living donors and  transplant recipients. (http://www.transplantliving.org/).  Learn how [facility] and the OPTN can help you.  Talk about any concerns that you may have with a transplant hospital.    [facility] is a not-for-profit organization that provides the administrative services for the national OPTN under federal contract to the Health Resources and Services Administration (HRSA), an agency under the U.S. Department of Health and Human Services (HHS).    [facility] and the OPTN are responsible for:    Providing educational material for patients, the public, and professionals.  Raising awareness of the need for donated organs and tissue.  Writing organ transplant policy with help from transplant professionals, transplant patients, transplant candidates, donor families, living donors, and the public.  Coordinating organ procurement, matching, and placement.  Collecting information about every organ transplant and donation that occurs in the [place].    Remember, you should contact your transplant hospital directly if you have questions or concerns about your own medical care including medical records, work-up progress, and test results.    [facility] is not your transplant hospital, and staff at [facility] will not be able to transfer you to your transplant hospital, so keep your transplant hospital’s phone number handy.    However, while you research your transplant needs and learn as much as you can about transplantation and donation, we welcome your call to our toll-free patient services line at [phone].      [facility] PIL Final Rev 1-

## 2024-07-16 NOTE — PROGRESS NOTES
Pharmacist Pre-Transplant Donor Screening Note     No current outpatient medications on file prior to visit.     No current facility-administered medications on file prior to visit.       The patient's reported medications have been reviewed. Based on the above medication list, there are no pharmacologic contraindications to living kidney donation.    Catherine Byrne, PharmD, BCTXP  Clinical Pharmacy Specialist - Solid Organ Transplant

## 2024-07-16 NOTE — PROGRESS NOTES
Spoke with Franecsca and let her know that she has been approved for donation. She was very happy to hear the news. I let her know that her recipient is not quite ready for transplant and we discussed options for moving forward including waiting for recipient readiness or being an ADP donor. She is opting for voucher donation and would like to attend virtual education on Monday 7/22 at 10am. She would like to start the donation process but also knows that her kids are starting back to school at the end of August and then their schedules get tight. She is going to speak with her family to discuss timeframe for surgery. In the meantime we will get her set up in the NKR system once she attends class and is consented to enroll.

## 2024-07-16 NOTE — COMMITTEE REVIEW
Presentation for Donation     Evaluation Date: 5/20/2024   Committee Review Date: 7/16/2024    Organ: Kidney    Transplant Phase: Evaluation  Transplant Status: Active    Transplant Coordinator: Grace Fu  Transplant Surgeon:      PCP: No primary care provider on file.    Committee Review Members:  Jamilae Eboni Russell MT   Pharmacy Catherine Byrne, PharmD    QUENTIN NUNO, [facility]   Transplant Kerline Roblero MD, Edda Heredia MD, Desiree Chairez, CARLOS, Lashonda Godwin, RN, Rachel Roberts, RN, Edenilson Bourne, PhD, Sola Ramirez MD       Suitability: None    Relative Contraindications: None    Absolute Contraindications: None    Committee Review Decision: Approved     Committee Discussion Details: Consults, labs, and imaging reviewed. Left nephrectomy for donation. Candidate approved for donation. Will be enrolled into NKR.

## 2024-07-22 ENCOUNTER — APPOINTMENT (OUTPATIENT)
Dept: TRANSPLANT | Facility: HOSPITAL | Age: 39
End: 2024-07-22
Payer: SUBSIDIZED

## 2024-07-22 ENCOUNTER — DOCUMENTATION (OUTPATIENT)
Dept: TRANSPLANT | Facility: HOSPITAL | Age: 39
End: 2024-07-22
Payer: SUBSIDIZED

## 2024-07-22 DIAGNOSIS — Z52.4 DONOR OF KIDNEY FOR TRANSPLANT: Primary | ICD-10-CM

## 2024-07-22 NOTE — PROGRESS NOTES
Patient received education regarding the following topics as part of the consent process for paired donation:  Kidney paired donation program information  Types of donors:  Directed donors  Non-Directed donors  Bridge donors    Matching requirements  Shipping risks  Unexpected events preventing donation or recipient transplantation  Remedy for failed exchanges  Communication between donors and recipients  Information about our Transplant Center  National and [facility] [place] outcomes for one-year patient and graft survival from the most recent [facility] program-specific report.   Donor's right to withdraw consent from the paired donation program at any time  Donor's consent to share their protected health information (PHI) with all other transplant hospitals in the D program  Donor's consent to have their kidney shipped  Option to be a bridge donor with a right to withdraw consent at any time.     The patient was given the opportunity to have questions answered and understands that the living donor team will be available to assist the patient throughout the entire kidney paired donation process.   Patient was provided with a copy of the signed informed consent for kidney paired donation.   Consent to enroll in Kidney Paired Donation signed on 07/22/2024

## 2024-07-22 NOTE — PROGRESS NOTES
Spoke to Francesca and let her know that we are sending her an HLA typing kit. Instructions provided to go to a lab affiliated with a [facility] and to avoid [facility] and [facility]. She plans to go to a [facility] lab. Information given that this kit will come with paid [employer] packaging to send it back overnight. Also let her know that we will get her A2 typing during this same blood draw. She expressed understanding and said that she was happy to get the labs done.

## 2024-07-25 ENCOUNTER — DOCUMENTATION (OUTPATIENT)
Dept: TRANSPLANT | Facility: HOSPITAL | Age: 39
End: 2024-07-25
Payer: SUBSIDIZED

## 2024-07-25 NOTE — PROGRESS NOTES
Pt received bill from  medicine for donor testing.  sent claim to donors insurance. This claim for $3534.00 should have been sent to  Transplant. Emailed JT is  janie.

## 2024-08-07 PROCEDURE — 86901 BLOOD TYPING SEROLOGIC RH(D): CPT | Mod: OUT | Performed by: SURGERY

## 2024-08-09 ENCOUNTER — LAB REQUISITION (OUTPATIENT)
Dept: LAB | Facility: CLINIC | Age: 39
End: 2024-08-09
Payer: SUBSIDIZED

## 2024-08-09 DIAGNOSIS — Z52.4 KIDNEY DONOR: ICD-10-CM

## 2024-08-12 LAB
ABO GROUP (TYPE) IN BLOOD: NORMAL
RH FACTOR (ANTIGEN D): NORMAL

## 2024-08-26 LAB
HLA CLS I TYP PNL BLD/T DONR HIGH RES: NORMAL
HLA RESULTS: NORMAL
HLA-DP2 QL: NORMAL
HLA-DQB1 HIGH RES: NORMAL
HLA-DRB1 HIGH RES: NORMAL

## 2024-09-06 ENCOUNTER — DOCUMENTATION (OUTPATIENT)
Dept: TRANSPLANT | Facility: HOSPITAL | Age: 39
End: 2024-09-06
Payer: SUBSIDIZED

## 2024-09-06 NOTE — PROGRESS NOTES
Spoke with Francesca this morning. She said that she was just going to call us because she heard that her recipient was transplanted. Francesca still wishes to proceed as a voucher donor. Her [facility] profile was updated to voucher donation and request for voucher petersen form sent. Francesca said that at this time she would like to donate after the first of the year. She will keep us updated on timeframe. I reminded her that she can pick her donation window.

## 2025-07-22 ENCOUNTER — DOCUMENTATION (OUTPATIENT)
Facility: HOSPITAL | Age: 40
End: 2025-07-22
Payer: SUBSIDIZED

## 2025-07-22 NOTE — PROGRESS NOTES
Francesca let me know that she is no longer interested in donation. She relayed that things have come up in her life and with her health that make it difficult to consider donation. She stated that she appreciated the care and assistance the team provided.